# Patient Record
Sex: MALE | Race: WHITE | Employment: FULL TIME | URBAN - METROPOLITAN AREA
[De-identification: names, ages, dates, MRNs, and addresses within clinical notes are randomized per-mention and may not be internally consistent; named-entity substitution may affect disease eponyms.]

---

## 2024-07-26 ENCOUNTER — OFFICE VISIT (OUTPATIENT)
Dept: PRIMARY CARE CLINIC | Age: 34
End: 2024-07-26

## 2024-07-26 VITALS
TEMPERATURE: 97.5 F | WEIGHT: 199 LBS | HEART RATE: 84 BPM | RESPIRATION RATE: 14 BRPM | SYSTOLIC BLOOD PRESSURE: 116 MMHG | OXYGEN SATURATION: 97 % | DIASTOLIC BLOOD PRESSURE: 82 MMHG | BODY MASS INDEX: 30.16 KG/M2 | HEIGHT: 68 IN

## 2024-07-26 DIAGNOSIS — M79.672 PAIN IN BOTH FEET: Primary | ICD-10-CM

## 2024-07-26 DIAGNOSIS — M79.671 PAIN IN BOTH FEET: Primary | ICD-10-CM

## 2024-07-26 DIAGNOSIS — F17.200 SMOKER: ICD-10-CM

## 2024-07-26 LAB
DEPRECATED RDW RBC AUTO: 13.5 % (ref 12.4–15.4)
FOLATE SERPL-MCNC: 10.02 NG/ML (ref 4.78–24.2)
HCT VFR BLD AUTO: 48.3 % (ref 40.5–52.5)
HGB BLD-MCNC: 16.8 G/DL (ref 13.5–17.5)
MCH RBC QN AUTO: 30.9 PG (ref 26–34)
MCHC RBC AUTO-ENTMCNC: 34.9 G/DL (ref 31–36)
MCV RBC AUTO: 88.7 FL (ref 80–100)
PLATELET # BLD AUTO: 213 K/UL (ref 135–450)
PMV BLD AUTO: 8.3 FL (ref 5–10.5)
RBC # BLD AUTO: 5.44 M/UL (ref 4.2–5.9)
VIT B12 SERPL-MCNC: 410 PG/ML (ref 211–911)
WBC # BLD AUTO: 8.3 K/UL (ref 4–11)

## 2024-07-26 NOTE — PROGRESS NOTES
PROGRESS NOTE  Date of Service:  7/26/2024    SUBJECTIVE:  Patient ID: Clay Peterson is a 34 y.o. male    HPI: new patient exam    He reports he is generally in good health and wanted to establish with a primary care physician  He has noted heat sensation at his feet.  He works as a  and finds that as he is driving his feet become very hot which is uncomfortable.  He reports no pain itself and no numbness or tingling sensation  No significant swelling in his feet  No change in his musculoskeletal abilities  He has had no redness bruising at his feet    He is a smoker smoking approximately 1 pack/day for the last 12 to 13 years  He does want to stop smoking but is not interested in medication assistance at this time    \    History reviewed. No pertinent past medical history.  History reviewed. No pertinent surgical history.   Social History     Tobacco Use    Smoking status: Every Day     Current packs/day: 1.50     Average packs/day: 1.5 packs/day for 15.0 years (22.5 ttl pk-yrs)     Types: Cigarettes     Start date: 8/5/2012    Smokeless tobacco: Never   Substance Use Topics    Alcohol use: Not Currently     Comment: Sometimes once in 3 months      Family History   Problem Relation Age of Onset    Heart Disease Mother      No current outpatient medications on file prior to visit.     No current facility-administered medications on file prior to visit.       No Known Allergies     Review of Systems    OBJECTIVE:  Vitals:    07/26/24 1430   BP: 116/82   Site: Left Upper Arm   Position: Sitting   Cuff Size: Medium Adult   Pulse: 84   Resp: 14   Temp: 97.5 °F (36.4 °C)   SpO2: 97%   Weight: 90.3 kg (199 lb)   Height: 1.725 m (5' 7.91\")      Body mass index is 30.34 kg/m².   Physical Exam  Vitals reviewed.   Constitutional:       Appearance: Normal appearance.   HENT:      Head: Normocephalic and atraumatic.      Right Ear: Tympanic membrane, ear canal and external ear normal.      Left Ear:

## 2024-07-27 DIAGNOSIS — E78.2 MIXED HYPERLIPIDEMIA: Primary | ICD-10-CM

## 2024-07-27 LAB
ALBUMIN SERPL-MCNC: 4.8 G/DL (ref 3.4–5)
ALBUMIN/GLOB SERPL: 1.8 {RATIO} (ref 1.1–2.2)
ALP SERPL-CCNC: 94 U/L (ref 40–129)
ALT SERPL-CCNC: 23 U/L (ref 10–40)
ANION GAP SERPL CALCULATED.3IONS-SCNC: 12 MMOL/L (ref 3–16)
AST SERPL-CCNC: 13 U/L (ref 15–37)
BILIRUB SERPL-MCNC: 0.8 MG/DL (ref 0–1)
BUN SERPL-MCNC: 10 MG/DL (ref 7–20)
CALCIUM SERPL-MCNC: 9.9 MG/DL (ref 8.3–10.6)
CHLORIDE SERPL-SCNC: 103 MMOL/L (ref 99–110)
CHOLEST SERPL-MCNC: 286 MG/DL (ref 0–199)
CO2 SERPL-SCNC: 26 MMOL/L (ref 21–32)
CREAT SERPL-MCNC: 0.9 MG/DL (ref 0.9–1.3)
GFR SERPLBLD CREATININE-BSD FMLA CKD-EPI: >90 ML/MIN/{1.73_M2}
GLUCOSE SERPL-MCNC: 83 MG/DL (ref 70–99)
HDLC SERPL-MCNC: 36 MG/DL (ref 40–60)
LDLC SERPL CALC-MCNC: 210 MG/DL
POTASSIUM SERPL-SCNC: 4.4 MMOL/L (ref 3.5–5.1)
PROT SERPL-MCNC: 7.5 G/DL (ref 6.4–8.2)
SODIUM SERPL-SCNC: 141 MMOL/L (ref 136–145)
TRIGL SERPL-MCNC: 200 MG/DL (ref 0–150)
TSH SERPL DL<=0.005 MIU/L-ACNC: 2.33 UIU/ML (ref 0.27–4.2)
VLDLC SERPL CALC-MCNC: 40 MG/DL

## 2024-07-27 RX ORDER — ATORVASTATIN CALCIUM 20 MG/1
20 TABLET, FILM COATED ORAL DAILY
Qty: 30 TABLET | Refills: 1 | Status: SHIPPED | OUTPATIENT
Start: 2024-07-27

## 2024-08-24 NOTE — PROGRESS NOTES
PROGRESS NOTE  Date of Service:  8/26/2024    SUBJECTIVE:  Patient ID: Clay Peterson is a 34 y.o. male    ASSESSMENT  1. Mixed hyperlipidemia        PLAN:   1. Mixed hyperlipidemia  -     Lipid Panel  -     Hepatic Function Panel  Will assess lipids and adjust atorvastatin dose as needed.  Will be mindful of his recent alcohol intake prior to this LFT blood draw    Return for with testing results.          HPI:     Hyperlipidemia-he started the atorvastatin and reports that he has had no side effects of myalgias or fatigue.  In fact he feels better overall than prior to our last appointment    He does note that he drank alcohol yesterday at a party      Patient's medications, allergies, past medical, surgical, social and family histories were reviewed and updated as appropriate.     OBJECTIVE:  Vitals:    08/26/24 1438   BP: 112/88   Site: Left Upper Arm   Position: Sitting   Cuff Size: Medium Adult   Pulse: 91   Resp: 16   Temp: 98.1 °F (36.7 °C)   SpO2: 98%   Weight: 92.2 kg (203 lb 3.2 oz)      Body mass index is 30.98 kg/m².   Physical Exam  Vitals reviewed.   Constitutional:       Appearance: Normal appearance.   HENT:      Head: Normocephalic and atraumatic.   Eyes:      General: No scleral icterus.     Conjunctiva/sclera: Conjunctivae normal.   Neurological:      General: No focal deficit present.      Mental Status: He is alert and oriented to person, place, and time.      Cranial Nerves: No cranial nerve deficit.   Psychiatric:         Attention and Perception: Attention and perception normal.         Mood and Affect: Mood and affect normal.         Speech: Speech normal.         Behavior: Behavior normal. Behavior is cooperative.         Thought Content: Thought content normal.         Cognition and Memory: Cognition and memory normal.         Judgment: Judgment normal.             Electronically signed by LUZ LOPEZ MD on 8/26/2024 at 3:27 PM.    Please note this chart was generated using

## 2024-08-26 ENCOUNTER — OFFICE VISIT (OUTPATIENT)
Dept: PRIMARY CARE CLINIC | Age: 34
End: 2024-08-26

## 2024-08-26 VITALS
DIASTOLIC BLOOD PRESSURE: 88 MMHG | HEART RATE: 91 BPM | RESPIRATION RATE: 16 BRPM | BODY MASS INDEX: 30.98 KG/M2 | WEIGHT: 203.2 LBS | OXYGEN SATURATION: 98 % | SYSTOLIC BLOOD PRESSURE: 112 MMHG | TEMPERATURE: 98.1 F

## 2024-08-26 DIAGNOSIS — E78.2 MIXED HYPERLIPIDEMIA: Primary | ICD-10-CM

## 2024-08-26 PROCEDURE — 99213 OFFICE O/P EST LOW 20 MIN: CPT | Performed by: FAMILY MEDICINE

## 2024-08-27 DIAGNOSIS — E78.2 MIXED HYPERLIPIDEMIA: ICD-10-CM

## 2024-08-27 LAB
ALBUMIN SERPL-MCNC: 4.8 G/DL (ref 3.4–5)
ALP SERPL-CCNC: 103 U/L (ref 40–129)
ALT SERPL-CCNC: 57 U/L (ref 10–40)
AST SERPL-CCNC: 23 U/L (ref 15–37)
BILIRUB DIRECT SERPL-MCNC: 0.4 MG/DL (ref 0–0.3)
BILIRUB INDIRECT SERPL-MCNC: 0.5 MG/DL (ref 0–1)
BILIRUB SERPL-MCNC: 0.9 MG/DL (ref 0–1)
CHOLEST SERPL-MCNC: 209 MG/DL (ref 0–199)
HDLC SERPL-MCNC: 50 MG/DL (ref 40–60)
LDLC SERPL CALC-MCNC: 136 MG/DL
PROT SERPL-MCNC: 7.3 G/DL (ref 6.4–8.2)
TRIGL SERPL-MCNC: 114 MG/DL (ref 0–150)
VLDLC SERPL CALC-MCNC: 23 MG/DL

## 2024-08-27 RX ORDER — ATORVASTATIN CALCIUM 40 MG/1
40 TABLET, FILM COATED ORAL DAILY
Qty: 90 TABLET | Refills: 0 | Status: SHIPPED | OUTPATIENT
Start: 2024-08-27

## 2024-12-05 ENCOUNTER — OFFICE VISIT (OUTPATIENT)
Dept: PRIMARY CARE CLINIC | Age: 34
End: 2024-12-05

## 2024-12-05 VITALS
OXYGEN SATURATION: 98 % | HEART RATE: 80 BPM | WEIGHT: 203.6 LBS | BODY MASS INDEX: 31.04 KG/M2 | DIASTOLIC BLOOD PRESSURE: 80 MMHG | TEMPERATURE: 97.5 F | RESPIRATION RATE: 14 BRPM | SYSTOLIC BLOOD PRESSURE: 136 MMHG

## 2024-12-05 DIAGNOSIS — E78.2 MIXED HYPERLIPIDEMIA: Primary | ICD-10-CM

## 2024-12-05 PROBLEM — R79.89 ABNORMAL LFTS: Status: ACTIVE | Noted: 2024-12-05

## 2024-12-05 ASSESSMENT — PATIENT HEALTH QUESTIONNAIRE - PHQ9
SUM OF ALL RESPONSES TO PHQ QUESTIONS 1-9: 0
1. LITTLE INTEREST OR PLEASURE IN DOING THINGS: NOT AT ALL
SUM OF ALL RESPONSES TO PHQ QUESTIONS 1-9: 0
SUM OF ALL RESPONSES TO PHQ9 QUESTIONS 1 & 2: 0
SUM OF ALL RESPONSES TO PHQ QUESTIONS 1-9: 0
SUM OF ALL RESPONSES TO PHQ QUESTIONS 1-9: 0
2. FEELING DOWN, DEPRESSED OR HOPELESS: NOT AT ALL

## 2024-12-05 NOTE — PROGRESS NOTES
PROGRESS NOTE  Date of Service:  12/5/2024    SUBJECTIVE:  Patient ID: Clay Peterson is a 34 y.o. male    ASSESSMENT  1. Mixed hyperlipidemia        PLAN:   1. Mixed hyperlipidemia  -     Lipid Panel  -     Hepatic Function Panel   Assess labs and adjust statin dose as needed     Return for with testing results.          HPI:       Hyperlipidemia-dose increased he reports no side effects    He does note that he drank alcohol the day before last liver function tests which were elevated    Has decreased to 5 cig per day and is hopeful to stop completely      Patient's medications, allergies, past medical, surgical, social and family histories were reviewed and updated as appropriate.     OBJECTIVE:  Vitals:    12/05/24 1540   BP: 136/80   Site: Left Upper Arm   Position: Sitting   Cuff Size: Medium Adult   Pulse: 80   Resp: 14   Temp: 97.5 °F (36.4 °C)   SpO2: 98%   Weight: 92.4 kg (203 lb 9.6 oz)      Body mass index is 31.04 kg/m².   Physical Exam  Vitals reviewed.   Constitutional:       Appearance: Normal appearance.   HENT:      Head: Normocephalic and atraumatic.   Eyes:      General: No scleral icterus.     Conjunctiva/sclera: Conjunctivae normal.   Neurological:      General: No focal deficit present.      Mental Status: He is alert and oriented to person, place, and time.      Cranial Nerves: No cranial nerve deficit.   Psychiatric:         Attention and Perception: Attention and perception normal.         Mood and Affect: Mood and affect normal.         Speech: Speech normal.         Behavior: Behavior normal. Behavior is cooperative.         Thought Content: Thought content normal.         Cognition and Memory: Cognition and memory normal.         Judgment: Judgment normal.             Electronically signed by LUZ LOPEZ MD on 12/5/2024 at 3:56 PM.    Please note this chart was generated using dragon dictation software.  Although every effort was made to ensure the accuracy of this automated

## 2024-12-06 DIAGNOSIS — E78.2 MIXED HYPERLIPIDEMIA: ICD-10-CM

## 2024-12-06 LAB
ALBUMIN SERPL-MCNC: 4.8 G/DL (ref 3.4–5)
ALP SERPL-CCNC: 91 U/L (ref 40–129)
ALT SERPL-CCNC: 53 U/L (ref 10–40)
AST SERPL-CCNC: 26 U/L (ref 15–37)
BILIRUB DIRECT SERPL-MCNC: 0.3 MG/DL (ref 0–0.3)
BILIRUB INDIRECT SERPL-MCNC: 0.5 MG/DL (ref 0–1)
BILIRUB SERPL-MCNC: 0.8 MG/DL (ref 0–1)
CHOLEST SERPL-MCNC: 141 MG/DL (ref 0–199)
HDLC SERPL-MCNC: 35 MG/DL (ref 40–60)
LDLC SERPL CALC-MCNC: 88 MG/DL
PROT SERPL-MCNC: 7.1 G/DL (ref 6.4–8.2)
TRIGL SERPL-MCNC: 92 MG/DL (ref 0–150)
VLDLC SERPL CALC-MCNC: 18 MG/DL

## 2024-12-06 RX ORDER — ATORVASTATIN CALCIUM 40 MG/1
40 TABLET, FILM COATED ORAL DAILY
Qty: 90 TABLET | Refills: 3 | Status: SHIPPED | OUTPATIENT
Start: 2024-12-06

## 2025-04-12 DIAGNOSIS — E78.2 MIXED HYPERLIPIDEMIA: ICD-10-CM

## 2025-04-14 RX ORDER — ATORVASTATIN CALCIUM 40 MG/1
40 TABLET, FILM COATED ORAL DAILY
Qty: 90 TABLET | Refills: 3 | OUTPATIENT
Start: 2025-04-14

## 2025-04-14 NOTE — TELEPHONE ENCOUNTER
Refill declined - rfill not due till Sept,2025  Medication:   Requested Prescriptions     Pending Prescriptions Disp Refills    atorvastatin (LIPITOR) 40 MG tablet 90 tablet 3     Sig: Take 1 tablet by mouth daily       Last Filled:  [id number]  #90 x 3     Patient Phone Number: [phone] (home)     Last appt: 12/5/2024   Next appt: Visit date not found    Last Lipid:   Lab Results   Component Value Date/Time    CHOL 141 12/05/2024 03:55 PM    TRIG 92 12/05/2024 03:55 PM    HDL 35 12/05/2024 03:55 PM